# Patient Record
Sex: FEMALE | Race: AMERICAN INDIAN OR ALASKA NATIVE
[De-identification: names, ages, dates, MRNs, and addresses within clinical notes are randomized per-mention and may not be internally consistent; named-entity substitution may affect disease eponyms.]

---

## 2018-11-01 ENCOUNTER — HOSPITAL ENCOUNTER (EMERGENCY)
Dept: HOSPITAL 97 - ER | Age: 48
LOS: 1 days | Discharge: HOME | End: 2018-11-02
Payer: COMMERCIAL

## 2018-11-01 DIAGNOSIS — F10.129: Primary | ICD-10-CM

## 2018-11-01 LAB
ALBUMIN SERPL BCP-MCNC: 3.9 G/DL (ref 3.4–5)
ALP SERPL-CCNC: 53 U/L (ref 45–117)
ALT SERPL W P-5'-P-CCNC: 16 U/L (ref 12–78)
AST SERPL W P-5'-P-CCNC: 11 U/L (ref 15–37)
BUN BLD-MCNC: 6 MG/DL (ref 7–18)
COHGB MFR BLDA: 0.8 % (ref 0–1.5)
GLUCOSE SERPLBLD-MCNC: 126 MG/DL (ref 74–106)
HCT VFR BLD CALC: 33.9 % (ref 36–45)
INR BLD: 0.96
LYMPHOCYTES # SPEC AUTO: 1.7 K/UL (ref 0.7–4.9)
MCH RBC QN AUTO: 27.8 PG (ref 27–35)
MCV RBC: 80.8 FL (ref 80–100)
METHAMPHET UR QL SCN: NEGATIVE
OXYHGB MFR BLDA: 91.3 % (ref 94–97)
PMV BLD: 9.7 FL (ref 7.6–11.3)
POTASSIUM SERPL-SCNC: 3.3 MMOL/L (ref 3.5–5.1)
RBC # BLD: 4.2 M/UL (ref 3.86–4.86)
SAO2 % BLDA: 92.9 % (ref 92–98.5)
THC SERPL-MCNC: NEGATIVE NG/ML

## 2018-11-01 PROCEDURE — 80329 ANALGESICS NON-OPIOID 1 OR 2: CPT

## 2018-11-01 PROCEDURE — 80048 BASIC METABOLIC PNL TOTAL CA: CPT

## 2018-11-01 PROCEDURE — 81025 URINE PREGNANCY TEST: CPT

## 2018-11-01 PROCEDURE — 96366 THER/PROPH/DIAG IV INF ADDON: CPT

## 2018-11-01 PROCEDURE — 81003 URINALYSIS AUTO W/O SCOPE: CPT

## 2018-11-01 PROCEDURE — 80307 DRUG TEST PRSMV CHEM ANLYZR: CPT

## 2018-11-01 PROCEDURE — 51702 INSERT TEMP BLADDER CATH: CPT

## 2018-11-01 PROCEDURE — 85025 COMPLETE CBC W/AUTO DIFF WBC: CPT

## 2018-11-01 PROCEDURE — 96365 THER/PROPH/DIAG IV INF INIT: CPT

## 2018-11-01 PROCEDURE — 85730 THROMBOPLASTIN TIME PARTIAL: CPT

## 2018-11-01 PROCEDURE — 93005 ELECTROCARDIOGRAM TRACING: CPT

## 2018-11-01 PROCEDURE — 99285 EMERGENCY DEPT VISIT HI MDM: CPT

## 2018-11-01 PROCEDURE — 80320 DRUG SCREEN QUANTALCOHOLS: CPT

## 2018-11-01 PROCEDURE — 80076 HEPATIC FUNCTION PANEL: CPT

## 2018-11-01 PROCEDURE — 96375 TX/PRO/DX INJ NEW DRUG ADDON: CPT

## 2018-11-01 PROCEDURE — 85610 PROTHROMBIN TIME: CPT

## 2018-11-01 PROCEDURE — 36415 COLL VENOUS BLD VENIPUNCTURE: CPT

## 2018-11-01 PROCEDURE — 82805 BLOOD GASES W/O2 SATURATION: CPT

## 2018-11-02 NOTE — ER
Nurse's Notes                                                                                     

 [facility]                                                                

Name: Contreras Cotter                                                                                

Age: 48 yrs                                                                                       

Sex: Female                                                                                       

: 1970                                                                                   

MRN: [id number]                                                                                   

Arrival Date: 2018                                                                          

Time: 21:06                                                                                       

Account#: [id number]                                                                            

Bed 7                                                                                             

Private MD:                                                                                       

Diagnosis: Alcohol intoxication above reasonable limits                                           

                                                                                                  

Presentation:                                                                                     

                                                                                             

21:18 Presenting complaint: EMS states: Pt was mad at her  and drank a large amount of tl2 

      scotch for the first time. Pt is only responsive to pain and is actively vomiting.          

      Transition of care: patient was not received from another setting of care. Onset of         

      symptoms was 2018 at 19:00. Risk Assessment: Do you want to hurt yourself      

      or someone else? Patient reports no desire to harm self or others. Initial Sepsis           

      Screen: Does the patient meet any 2 criteria? No. Patient's initial sepsis screen is        

      negative. Does the patient have a suspected source of infection? No. Patient's initial      

      sepsis screen is negative. Care prior to arrival: None.                                     

21:18 Method Of Arrival: EMS: [facility]                                                    tl2 

21:18 Acuity: [facility] 2                                                                           tl2 

                                                                                                  

Triage Assessment:                                                                                

21:21 General: Appears well groomed, Behavior is drowsy, listless, Smells of alcohol. Pain:   tl2 

      Unable to use pain scale. Patient is disoriented. Neuro: Level of Consciousness is          

      stuporous, patient is responsive to pain stimuli. Cardiovascular: Patient's skin is         

      warm and dry. Rhythm is sinus rhythm. Respiratory: Airway is patent Respiratory effort      

      is even, unlabored, Respiratory pattern is regular, symmetrical. GI: Pt is actively         

      vomiting. : No signs and/or symptoms were reported regarding the genitourinary            

      system. Derm: Skin is pink, warm \T\ dry.                                                   

                                                                                                  

OB/GYN:                                                                                           

                                                                                             

01:38 LMP 10/29/2018                                                                          tl1 

                                                                                                  

Historical:                                                                                       

- Allergies:                                                                                      

                                                                                             

21:21 Unable to obtain;                                                                       tl2 

- Home Meds:                                                                                      

21:21 Unable to obtain [Active];                                                              tl2 

- PMHx:                                                                                           

21:21 Unable to obtain;                                                                       tl2 

                                                                                                  

- Immunization history:: Adult Immunizations unknown.                                             

- Social history:: Smoking status: unknown .                                                      

- Ebola Screening: : No symptoms or risks identified at this time.                                

                                                                                                  

                                                                                                  

Screenin:24 Abuse screen: Denies threats or abuse. Nutritional screening: No deficits noted.        tl2 

      Tuberculosis screening: No symptoms or risk factors identified. Fall Risk IV access (20     

      points). Mental Status- Overestimates/Forgets Limitations (15 pts.).                        

                                                                                                  

Assessment:                                                                                       

21:48 General: see triage assessment.                                                         tl2 

21:57 Reassessment: Patient appears in no apparent distress at this time. Family at bedside.  tl2 

22:35 Reassessment: Patient appears in no apparent distress at this time. No changes from     tl2 

      previously documented assessment.                                                           

                                                                                             

00:15 Reassessment: Pt continues to remain asleep, VSS. Fluids infusing.                      tl2 

01:33 Reassessment: Patient appears in no apparent distress at this time. Patient and/or      tl2 

      family updated on plan of care and expected duration. Pain level reassessed. Patient is     

      alert, oriented x 3, equal unlabored respirations, skin warm/dry/pink. Pt awake and         

      oriented. Pt was able to ambulate to the restroom with minimal assistance. Pt is AOx4       

      and answers questions appropriately. Pt stable for discharge.                               

                                                                                                  

Vital Signs:                                                                                      

                                                                                             

21:21  / 78; Pulse 71; Resp 22; Temp 97.5(O); Pulse Ox 98% on R/A; Weight 81.65 kg;     tl2 

      Height 5 ft. 8 in. (172.72 cm);                                                             

21:57  / 78; Pulse 71; Resp 22; Pulse Ox 100% on R/A;                                   tl2 

22:35  / 74; Pulse 73; Resp 12; Pulse Ox 100% on R/A;                                   tl2 

23:28  / 52; Pulse 71; Resp 15; Pulse Ox 99% on R/A;                                    tl2 

                                                                                             

00:15  / 67; Pulse 75; Resp 13; Pulse Ox 100% on R/A;                                   tl2 

01:16  / 83; Pulse 85; Resp 18; Pulse Ox 98% on R/A;                                    tl2 

                                                                                             

21:21 Body Mass Index 27.37 (81.65 kg, 172.72 cm)                                             tl2 

                                                                                                  

ED Course:                                                                                        

                                                                                             

21:06 Patient arrived in ED.                                                                  ds1 

21:18 Volodymyr Rosas MD is Attending Physician.                                             ps1 

21:20 Triage completed.                                                                       tl2 

21:21 Arm band placed on right wrist.                                                         tl2 

21:24 Patient has correct armband on for positive identification. Bed in low position. Call   tl2 

      light in reach. Side rails up X2.                                                           

21:47 Inserted saline lock: 22 gauge in right hand, using aseptic technique. Blood collected. tl2 

21:47 Inserted saline lock: 20 gauge in left hand, using aseptic technique.                   tl2 

21:48 Milligan cath inserted, using sterile technique, 16 Fr., by ED Tech, balloon inflated, to  tl2 

      gravity drainage, urine specimen collected. returned clear yellow urine. Patient            

      tolerated well.                                                                             

21:57 Danielle Garvey, RN is Primary Nurse.                                                      tl2 

                                                                                             

01:33 No provider procedures requiring assistance completed. IV discontinued, intact,         tl2 

      bleeding controlled, No redness/swelling at site. Pressure dressing applied.                

                                                                                                  

Administered Medications:                                                                         

                                                                                             

21:43 Drug: Banana Bag - (NS 0.9% 1000 ml, foLIC Acid 1 mg, Thiamine 100 mg, Multivitamin 1   tl1 

      amp) Route: IV; Rate: calculated rate; Site: right hand;                                    

                                                                                             

01:35 Follow up: IV Status: Completed infusion; IV Intake: 1000ml                             tl2 

                                                                                             

21:43 Drug: D5-1/2 NS 1000 ml Route: IV; Rate: 1000 ml/hr; Site: left hand;                   tl1 

23:16 Follow up: IV Status: Completed infusion                                                tl1 

21:43 Drug: Zofran 4 mg Route: IVP; Site: right hand;                                         tl2 

                                                                                             

01:35 Follow up: Response: No adverse reaction; Vomiting decreased                            tl2 

                                                                                             

22:15 Drug: Zofran 4 mg Route: IVP; Site: right hand;                                         tl2 

                                                                                             

01:36 Follow up: Response: No adverse reaction; Vomiting decreased                            tl2 

                                                                                                  

                                                                                                  

Intake:                                                                                           

00:35 PO: 0ml; IV: 2000ml; Total: 2000ml.                                                     tl1 

01:35 IV: 1000ml; Total: 3000ml.                                                              tl2 

                                                                                                  

Output:                                                                                           

00:35 Urine: 2200ml (Milligan); Total: 2200ml.                                                   tl1 

                                                                                                  

Outcome:                                                                                          

01:28 Discharge ordered by MD.                                                                ps1 

01:33 Discharged to home ambulatory, with family.                                             tl2 

01:33 Condition: stable                                                                           

01:33 Discharge instructions given to patient, family, Instructed on discharge instructions,      

      follow up and referral plans. safety practices, Demonstrated understanding of               

      instructions, follow-up care.                                                               

01:39 Patient left the ED.                                                                    tl1 

                                                                                                  

Signatures:                                                                                       

Alexa Pabon                                ds1                                                  

Lasagna, Mercedez, RN                      RN   tl1                                                  

Danielle Garvey, RN                        RN   tl2                                                  

Volodymyr Rosas, MD [facility]   ps1                                                  

                                                                                                  

**************************************************************************************************

## 2018-11-02 NOTE — EDPHYS
Physician Documentation                                                                           

 [facility]                                                                

Name: Contreras Cotter                                                                                

Age: 48 yrs                                                                                       

Sex: Female                                                                                       

: 1970                                                                                   

MRN: [id number]                                                                                   

Arrival Date: 2018                                                                          

Time: 21:06                                                                                       

Account#: [id number]                                                                            

Bed 7                                                                                             

Private MD:                                                                                       

ED Physician Volodymyr Rosas                                                                      

HPI:                                                                                              

                                                                                             

21:18 This 48 yrs old Other Female presents to ER via Unassigned with complaints of ETOH      ps1 

      Abuse, Vomiting.                                                                            

21:18 Patient has no history of ETOH abuse and got into argument with . Drank an       ps1 

      excessive amount of scotch and was BIBEMS for AMS. Patient not providing history. Aroma     

      of ETOH on or about the person. .                                                           

                                                                                                  

OB/GYN:                                                                                           

                                                                                             

01:38 LMP 10/29/2018                                                                          tl1 

                                                                                                  

Historical:                                                                                       

- Allergies:                                                                                      

                                                                                             

21:21 Unable to obtain;                                                                       tl2 

- Home Meds:                                                                                      

21:21 Unable to obtain [Active];                                                              tl2 

- PMHx:                                                                                           

21:21 Unable to obtain;                                                                       tl2 

                                                                                                  

- Immunization history:: Adult Immunizations unknown.                                             

- Social history:: Smoking status: unknown .                                                      

- Ebola Screening: : No symptoms or risks identified at this time.                                

                                                                                                  

                                                                                                  

ROS:                                                                                              

21:18 Unable to obtain ROS due to altered mental status.                                      ps1 

                                                                                                  

Exam:                                                                                             

21:18 Head/Face:  Normocephalic, atraumatic. Chest/axilla:  Normal chest wall appearance and  ps1 

      motion.  Nontender with no deformity.  No lesions are appreciated. Respiratory:  Lungs      

      have equal breath sounds bilaterally, clear to auscultation and percussion.  No rales,      

      rhonchi or wheezes noted.  No increased work of breathing, no retractions or nasal          

      flaring. Abdomen/GI:  Soft, non-tender, with normal bowel sounds.  No distension or         

      tympany.  No guarding or rebound.  No evidence of tenderness throughout.                    

21:18 MS/ Extremity:  Pulses equal, no cyanosis.  Neurovascular intact.  Full, normal range       

      of motion.                                                                                  

21:18 Constitutional: The patient appears lethargic, smells of alcohol, ETOH, unkempt.            

21:18 Cardiovascular: Rate: tachycardic, Rhythm: regular, Pulses: no pulse deficits are           

      appreciated.                                                                                

21:18 Neuro: Orientation: Not oriented to person, place, time, Mentation: unable to follow        

      commands, Cerebellar function: unable to test, unable to follow commands.                   

                                                                                                  

Vital Signs:                                                                                      

21:21  / 78; Pulse 71; Resp 22; Temp 97.5(O); Pulse Ox 98% on R/A; Weight 81.65 kg;     tl2 

      Height 5 ft. 8 in. (172.72 cm);                                                             

21:57  / 78; Pulse 71; Resp 22; Pulse Ox 100% on R/A;                                   tl2 

22:35  / 74; Pulse 73; Resp 12; Pulse Ox 100% on R/A;                                   tl2 

23:28  / 52; Pulse 71; Resp 15; Pulse Ox 99% on R/A;                                    tl2 

11                                                                                             

00:15  / 67; Pulse 75; Resp 13; Pulse Ox 100% on R/A;                                   tl2 

01:16  / 83; Pulse 85; Resp 18; Pulse Ox 98% on R/A;                                    tl2 

                                                                                             

21:21 Body Mass Index 27.37 (81.65 kg, 172.72 cm)                                             tl2 

                                                                                                  

MDM:                                                                                              

                                                                                             

21:23 Patient medically screened.                                                             ps1 

                                                                                             

01:26 Data reviewed: vital signs, nurses notes, patient is alert and oriented.  at     ps1 

      bedside and sober. Ambulatory. Stable for discharge. , and as a result, I will              

      discharge patient. Counseling: I had a detailed discussion with the patient and/or          

      guardian regarding: the historical points, exam findings, and any diagnostic results        

      supporting the discharge/admit diagnosis, the need for outpatient follow up, to return      

      to the emergency department if symptoms worsen or persist or if there are any questions     

      or concerns that arise at home.                                                             

                                                                                                  

                                                                                             

21:15 Order name: Acetaminophen                                                               [facility] 

                                                                                             

21:15 Order name: Basic Metabolic Panel                                                       [facility] 

                                                                                             

21:15 Order name: CBC with Diff                                                               [facility] 

                                                                                             

21:15 Order name: ETOH Level                                                                  [facility] 

                                                                                             

21:15 Order name: Hepatic Function; Complete Time: 22:12                                      [facility] 

                                                                                             

21:15 Order name: PT-INR; Complete Time: 22:25                                                [facility] 

                                                                                             

21:15 Order name: Ptt, Activated; Complete Time: 22:25                                        [facility] 

                                                                                             

21:15 Order name: Salicylate; Complete Time: 22:25                                            [facility] 

                                                                                             

21:15 Order name: Urine Drug Screen; Complete Time: 22:12                                     [facility] 

                                                                                             

21:15 Order name: Acetaminophen Level; Complete Time: 22:12                                   [facility]

                                                                                             

21:15 Order name: Basic Metabolic Panel; Complete Time: 22:12                                 [facility]

                                                                                             

21:15 Order name: CBC with Automated Diff; Complete Time: 22:01                               [facility]

                                                                                             

21:15 Order name: Alcohol Serum/Plasma; Complete Time: 22:06                                  EDMS

                                                                                             

21:22 Order name: ABG                                                                         ps1 

                                                                                             

21:15 Order name: EKG; Complete Time: 21:16                                                   snw 

                                                                                             

21:15 Order name: EKG - Nurse/Tech; Complete Time: 21:47                                      snw 

                                                                                             

21:15 Order name: IV Saline Lock; Complete Time: 21:43                                        snw 

                                                                                             

21:15 Order name: Labs collected and sent; Complete Time: 21:47                               snw 

                                                                                             

21:15 Order name: Urine Dipstick-Ancillary (obtain specimen); Complete Time: 21:47            snw 

                                                                                             

21:23 Order name: ABG Arterial Blood Gas; Complete Time: 22:02                                EDMS

                                                                                             

21:54 Order name: Urine Dipstick--Ancillary (enter results); Complete Time: 22:01             ms  

                                                                                             

21:54 Order name: Urine Pregnancy--Ancillary (enter results); Complete Time: 22:01            ms  

                                                                                             

21:58 Order name: Milligan; Complete Time: 21:58                                                 tl2 

                                                                                                  

Administered Medications:                                                                         

                                                                                             

21:43 Drug: Banana Bag - (NS 0.9% 1000 ml, foLIC Acid 1 mg, Thiamine 100 mg, Multivitamin 1   tl1 

      amp) Route: IV; Rate: calculated rate; Site: right hand;                                    

                                                                                             

01:35 Follow up: IV Status: Completed infusion; IV Intake: 1000ml                             tl2 

                                                                                             

21:43 Drug: D5-1/2 NS 1000 ml Route: IV; Rate: 1000 ml/hr; Site: left hand;                   tl1 

23:16 Follow up: IV Status: Completed infusion                                                tl1 

21:43 Drug: Zofran 4 mg Route: IVP; Site: right hand;                                         tl2 

                                                                                             

01:35 Follow up: Response: No adverse reaction; Vomiting decreased                            tl2 

                                                                                             

22:15 Drug: Zofran 4 mg Route: IVP; Site: right hand;                                         tl2 

                                                                                             

01:36 Follow up: Response: No adverse reaction; Vomiting decreased                            tl2 

                                                                                                  

                                                                                                  

Disposition:                                                                                      

01:29 Chart complete.                                                                         ps1 

                                                                                                  

Disposition:                                                                                      

18 01:28 Discharged to Home. Impression: Alcohol intoxication above reasonable limits.      

- Condition is Stable.                                                                            

- Discharge Instructions: Alcohol Intoxication.                                                   

- Prescriptions for Zofran 4 mg Oral Tablet - take 1 tablet by ORAL route every 12                

  hours As needed; 20 tablet.                                                                     

- Medication Reconciliation Form, Thank You Letter, Antibiotic Education, Prescription            

  Opioid Use form.                                                                                

- Follow up: Private Physician; When: As needed; Reason: Recheck today's complaints,              

  Continuance of care, Re-evaluation by your physician. Follow up: Emergency                      

  Department; When: As needed; Reason: Worsening of condition.                                    

- Problem is new.                                                                                 

- Symptoms have improved.                                                                         

                                                                                                  

                                                                                                  

                                                                                                  

Signatures:                                                                                       

Dispatcher [employer]                           EDMS                                                 

Linda Lyn, BEBO-C                 FNP-Csnw                                                  

[place] RN                      RN   tl1                                                  

Danielle Garvey RN                        RN   tl2                                                  

Volodymyr Rosas MD MD   ps1                                                  

                                                                                                  

Corrections: (The following items were deleted from the chart)                                    

01:39 01:28 2018 01:28 Discharged to Home. Impression: Alcohol intoxication above       tl1 

      reasonable limits. Condition is Stable. Forms are Medication Reconciliation Form, Thank     

      You Letter, Antibiotic Education, Prescription Opioid Use. Follow up: Private               

      Physician; When: As needed; Reason: Recheck today's complaints, Continuance of care,        

      Re-evaluation by your physician. Follow up: Emergency Department; When: As needed;          

      Reason: Worsening of condition. Problem is new. Symptoms have improved. ps1                 

                                                                                                  

**************************************************************************************************

## 2018-11-02 NOTE — EKG
Test Date:    2018-11-01               Test Time:    21:42:53

Technician:   MALIK                                     

                                                     

MEASUREMENT RESULTS:                                       

Intervals:                                           

Rate:         67                                     

CT:           202                                    

QRSD:         86                                     

QT:           444                                    

QTc:          469                                    

Axis:                                                

P:            44                                     

CT:           202                                    

QRS:          19                                     

T:            4                                      

                                                     

INTERPRETIVE STATEMENTS:                                       

                                                     

Normal sinus rhythm

Possible Left atrial enlargement

Abnormal ECG

No previous ECG available for comparison



Electronically Signed On 11-02-18 08:57:10 CDT by Henrique Weeks